# Patient Record
Sex: FEMALE | URBAN - NONMETROPOLITAN AREA
[De-identification: names, ages, dates, MRNs, and addresses within clinical notes are randomized per-mention and may not be internally consistent; named-entity substitution may affect disease eponyms.]

---

## 2023-05-16 ENCOUNTER — APPOINTMENT (RX ONLY)
Dept: URBAN - NONMETROPOLITAN AREA CLINIC 13 | Facility: CLINIC | Age: 68
Setting detail: DERMATOLOGY
End: 2023-05-16

## 2023-05-16 DIAGNOSIS — Z41.9 ENCOUNTER FOR PROCEDURE FOR PURPOSES OTHER THAN REMEDYING HEALTH STATE, UNSPECIFIED: ICD-10-CM

## 2023-05-16 PROCEDURE — ? COSMETIC CONSULTATION: FILLERS

## 2023-05-16 PROCEDURE — ? FOREVER YOUNG BBL

## 2023-05-16 ASSESSMENT — LOCATION ZONE DERM
LOCATION ZONE: FACE
LOCATION ZONE: NECK

## 2023-05-16 ASSESSMENT — LOCATION DETAILED DESCRIPTION DERM
LOCATION DETAILED: LEFT CENTRAL LATERAL NECK
LOCATION DETAILED: LEFT CENTRAL MALAR CHEEK
LOCATION DETAILED: RIGHT MEDIAL FOREHEAD
LOCATION DETAILED: RIGHT CENTRAL LATERAL NECK
LOCATION DETAILED: RIGHT INFERIOR CENTRAL MALAR CHEEK
LOCATION DETAILED: LEFT CHIN
LOCATION DETAILED: SUBMENTAL CHIN

## 2023-05-16 ASSESSMENT — LOCATION SIMPLE DESCRIPTION DERM
LOCATION SIMPLE: RIGHT CHEEK
LOCATION SIMPLE: LEFT CHEEK
LOCATION SIMPLE: SUBMENTAL CHIN
LOCATION SIMPLE: RIGHT FOREHEAD
LOCATION SIMPLE: NECK
LOCATION SIMPLE: CHIN

## 2023-05-16 NOTE — PROCEDURE: FOREVER YOUNG BBL
Procedure Note: After applying gel and applying protective eyeware, treatment was administered using the setting parameters listed above. Patient tolerated well, recommended 2 to 3 treatments per year. She does have rosacea, and I let her know that this could help improve her rosacea. She may be open to other treatments, such as filler for the cheeks, which has been discussed in consult.
Pulse Width Units: milliseconds
Treatment Number: 1
Pulse Width - Will Not Render If 0: 10
Pulse Width - Will Not Render If 0: 0
Passes (Optional): 2
Spot Size: Finesse Adapter Size: 15 x 15 mm square
Add On Skintyte?: yes
Post-Care Instructions: I reviewed with the patient in detail post-care instructions. Patient should avoid sun exposure before and after treatment.  Patient should wear sunscreen. Laser enzyme gel applied. Photos taken.
Spot Size: Finesse Adapter Size: 15 x 45 mm (No Finesse Adapter)
Temp (C): 15
Rate (Hz): 1.0
Skin Type: II
Technique: Static
Fluence (J/Cm2) - Will Not Render If 0: 11
Irradiance (Fierro/Cm2)- Will Not Render If 0: 12
Technique: In-Motion
Detail Level: Simple
Filter: 515nm Filter
Filter: 800ST Filter
Filter: 560nm Filter
Add Setting 3?: no
Price (Use Numbers Only, No Special Characters Or $): 191
Total Pulses (Optional): 20,000
Consent: Written consent obtained.  Risks reviewed including but not limited to crusting, scabbing, blistering, scarring, darker or lighter pigmentary change, and incomplete clearance.
Anesthesia Type: 1% lidocaine with epinephrine

## 2023-07-17 ENCOUNTER — APPOINTMENT (RX ONLY)
Dept: URBAN - NONMETROPOLITAN AREA CLINIC 13 | Facility: CLINIC | Age: 68
Setting detail: DERMATOLOGY
End: 2023-07-17

## 2023-07-17 DIAGNOSIS — Z41.9 ENCOUNTER FOR PROCEDURE FOR PURPOSES OTHER THAN REMEDYING HEALTH STATE, UNSPECIFIED: ICD-10-CM

## 2023-07-17 PROCEDURE — ? FOREVER YOUNG BBL

## 2023-07-17 ASSESSMENT — LOCATION ZONE DERM
LOCATION ZONE: FACE
LOCATION ZONE: SCALP
LOCATION ZONE: NECK
LOCATION ZONE: NOSE

## 2023-07-17 ASSESSMENT — LOCATION DETAILED DESCRIPTION DERM
LOCATION DETAILED: RIGHT INFERIOR LATERAL FOREHEAD
LOCATION DETAILED: RIGHT INFERIOR MEDIAL MALAR CHEEK
LOCATION DETAILED: RIGHT INFERIOR CENTRAL MALAR CHEEK
LOCATION DETAILED: RIGHT SUPERIOR ANTERIOR NECK
LOCATION DETAILED: SUBMENTAL CHIN
LOCATION DETAILED: SUPERIOR MID FOREHEAD
LOCATION DETAILED: NASAL DORSUM
LOCATION DETAILED: MEDIAL FRONTAL SCALP
LOCATION DETAILED: LEFT NASAL ALA
LOCATION DETAILED: LEFT INFERIOR LATERAL FOREHEAD
LOCATION DETAILED: RIGHT CENTRAL LATERAL NECK
LOCATION DETAILED: LEFT SUPERIOR ANTERIOR NECK
LOCATION DETAILED: LEFT SUPERIOR CENTRAL BUCCAL CHEEK
LOCATION DETAILED: LEFT CENTRAL LATERAL NECK
LOCATION DETAILED: LEFT CENTRAL MALAR CHEEK
LOCATION DETAILED: RIGHT CENTRAL BUCCAL CHEEK

## 2023-07-17 ASSESSMENT — LOCATION SIMPLE DESCRIPTION DERM
LOCATION SIMPLE: FRONTAL SCALP
LOCATION SIMPLE: LEFT NOSE
LOCATION SIMPLE: NOSE
LOCATION SIMPLE: NECK
LOCATION SIMPLE: RIGHT CHEEK
LOCATION SIMPLE: RIGHT ANTERIOR NECK
LOCATION SIMPLE: SUBMENTAL CHIN
LOCATION SIMPLE: LEFT ANTERIOR NECK
LOCATION SIMPLE: SUPERIOR FOREHEAD
LOCATION SIMPLE: LEFT CHEEK
LOCATION SIMPLE: RIGHT FOREHEAD
LOCATION SIMPLE: LEFT FOREHEAD

## 2023-07-17 NOTE — PROCEDURE: FOREVER YOUNG BBL
Pulse Width Units: milliseconds
Temp (C): 15
Rate (Hz): 1.0
Procedure Note: After applying gel and applying protective eyeware, treatment was administered using the setting parameters listed above. Erythema noted, particularly on the neck. She tolerated it well and we discussed doing two more treatments starting in the fall. Sent her home with Epionce samples, suncreen and cold compress. SHERRI
Total Pulses (Optional): vessels around nose
Spot Size: Finesse Adapter Size: 15 x 45 mm (No Finesse Adapter)
Filter: 800ST Filter
Spot Size: Finesse Adapter Size: 15 x 15 mm square
Fluence (J/Cm2) - Will Not Render If 0: 18
Passes (Optional): 1
Consent: Written consent obtained.  Risks reviewed including but not limited to crusting, scabbing, blistering, scarring, darker or lighter pigmentary change, and incomplete clearance.
Pulse Width - Will Not Render If 0: 10
Add Setting 3?: yes
Pulse Width - Will Not Render If 0: 20
Anesthesia Type: 1% lidocaine with epinephrine
Price (Use Numbers Only, No Special Characters Or $): 525.00
Passes (Optional): 2
Treatment Number: 0
Post-Care Instructions: I reviewed with the patient in detail post-care instructions. Patient should avoid sun exposure before and after treatment.  Patient should wear sunscreen. Laser enzyme gel applied. Sunscreen applied. Photos taken. Good pigment pull. SSRN
Technique: Static
Filter: 560nm Filter
Fluence (J/Cm2) - Will Not Render If 0: 12
Add On Skintyte?: no
Spot Size: Finesse Adapter Size: 3 mm round
Detail Level: Simple
Technique: In-Motion
Filter: 515nm Filter